# Patient Record
Sex: MALE | Race: BLACK OR AFRICAN AMERICAN | NOT HISPANIC OR LATINO | Employment: UNEMPLOYED | URBAN - METROPOLITAN AREA
[De-identification: names, ages, dates, MRNs, and addresses within clinical notes are randomized per-mention and may not be internally consistent; named-entity substitution may affect disease eponyms.]

---

## 2017-10-05 DIAGNOSIS — M25.531 RIGHT WRIST PAIN: Primary | ICD-10-CM

## 2017-10-06 ENCOUNTER — OFFICE VISIT (OUTPATIENT)
Dept: ORTHOPEDICS | Facility: CLINIC | Age: 32
End: 2017-10-06
Payer: MEDICAID

## 2017-10-06 ENCOUNTER — HOSPITAL ENCOUNTER (OUTPATIENT)
Dept: RADIOLOGY | Facility: HOSPITAL | Age: 32
Discharge: HOME | End: 2017-10-06
Attending: ORTHOPAEDIC SURGERY
Payer: MEDICAID

## 2017-10-06 VITALS
WEIGHT: 216.06 LBS | HEART RATE: 60 BPM | HEIGHT: 67 IN | BODY MASS INDEX: 33.91 KG/M2 | DIASTOLIC BLOOD PRESSURE: 75 MMHG | SYSTOLIC BLOOD PRESSURE: 132 MMHG

## 2017-10-06 DIAGNOSIS — S62.101D CLOSED FRACTURE OF RIGHT WRIST WITH ROUTINE HEALING, SUBSEQUENT ENCOUNTER: Primary | ICD-10-CM

## 2017-10-06 DIAGNOSIS — M25.531 RIGHT WRIST PAIN: ICD-10-CM

## 2017-10-06 DIAGNOSIS — S63.501A SPRAIN OF RIGHT WRIST, INITIAL ENCOUNTER: Primary | ICD-10-CM

## 2017-10-06 PROCEDURE — 99999 PR PBB SHADOW E&M-EST. PATIENT-LVL III: CPT | Mod: PBBFAC,,, | Performed by: ORTHOPAEDIC SURGERY

## 2017-10-06 PROCEDURE — 99204 OFFICE O/P NEW MOD 45 MIN: CPT | Mod: S$PBB,,, | Performed by: ORTHOPAEDIC SURGERY

## 2017-10-06 PROCEDURE — 99213 OFFICE O/P EST LOW 20 MIN: CPT | Mod: PBBFAC,25,PO | Performed by: ORTHOPAEDIC SURGERY

## 2017-10-06 PROCEDURE — 73110 X-RAY EXAM OF WRIST: CPT | Mod: TC,PO,RT

## 2017-10-06 PROCEDURE — 73110 X-RAY EXAM OF WRIST: CPT | Mod: 26,RT,, | Performed by: RADIOLOGY

## 2017-10-06 RX ORDER — BACLOFEN 20 MG/1
TABLET ORAL
Refills: 0 | COMMUNITY
Start: 2017-09-26

## 2017-10-06 RX ORDER — HYDROCODONE BITARTRATE AND ACETAMINOPHEN 5; 325 MG/1; MG/1
1 TABLET ORAL
COMMUNITY
Start: 2017-10-03 | End: 2017-10-06

## 2017-10-06 RX ORDER — IBUPROFEN 800 MG/1
TABLET ORAL
Refills: 0 | COMMUNITY
Start: 2017-09-26

## 2017-10-06 NOTE — PATIENT INSTRUCTIONS
Wrist Sprain  A sprain is an injury to the ligaments or capsule that holds a joint together. There are no broken bones. Most sprains take about 3 to 6 weeks to heal. If it a severe sprain where the ligament is completely torn, it can take months to recover.     Most wrist sprains are treated with a splint, wrist brace, or elastic wrap for support. Severe sprains may require surgery.  Home care  · Keep your arm elevated to reduce pain and swelling. This is very important during the first 48 hours.  · Apply an ice pack over the injured area for 15 to 20 minutes every 3 to 6 hours. You should do this for the first 24 to 48 hours. You can make an ice pack by filling a plastic bag that seals at the top with ice cubes and then wrapping it with a thin towel. Continue to use ice packs for relief of pain and swelling as needed. As the ice melts, be careful to avoid getting your wrap, splint, or cast wet. After 48 hours, apply heat (warm shower or warm bath) for 15 to 20 minutes several times a day, or alternate ice and heat.   · You may use over-the-counter pain medicine to control pain, unless another pain medicine was prescribed. If you have chronic liver or kidney disease or ever had a stomach ulcer or GI bleeding, talk with your doctor before using these medicines.  · If you were given a splint or brace, wear it for the time advised by your doctor.  Follow-up care  Follow up with your healthcare provider as advised. Any X-rays you had today dont show any broken bones, breaks, or fractures. Sometimes fractures dont show up on the first X-ray. Bruises and sprains can sometimes hurt as much as a fracture. These injuries can take time to heal completely. If your symptoms dont improve or they get worse, talk with your doctor. You may need a repeat X-ray. If X-rays were taken, you will be told of any new findings that may affect your care.  When to seek medical advice  Call your healthcare provider right away if any of  these occur:  · Pain or swelling increases  · Fingers or hand becomes cold, blue, numb, or tingly  Date Last Reviewed: 11/20/2015  © 0764-4657 [employer]. [address] [place] [address], [place] [address]. All rights reserved. This information is not intended as a substitute for professional medical care. Always follow your healthcare professional's instructions.
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
## 2017-10-14 ENCOUNTER — HOSPITAL ENCOUNTER (EMERGENCY)
Facility: HOSPITAL | Age: 32
Discharge: HOME | End: 2017-10-14
Attending: EMERGENCY MEDICINE
Payer: MEDICAID

## 2017-10-14 VITALS
OXYGEN SATURATION: 98 % | WEIGHT: 215 LBS | DIASTOLIC BLOOD PRESSURE: 90 MMHG | SYSTOLIC BLOOD PRESSURE: 145 MMHG | HEIGHT: 67 IN | BODY MASS INDEX: 33.74 KG/M2 | TEMPERATURE: 98 F | HEART RATE: 67 BPM | RESPIRATION RATE: 18 BRPM

## 2017-10-14 DIAGNOSIS — J06.9 UPPER RESPIRATORY TRACT INFECTION, UNSPECIFIED TYPE: Primary | ICD-10-CM

## 2017-10-14 PROCEDURE — 99283 EMERGENCY DEPT VISIT LOW MDM: CPT

## 2017-10-14 RX ORDER — BENZONATATE 100 MG/1
100 CAPSULE ORAL 3 TIMES DAILY PRN
Qty: 20 CAPSULE | Refills: 0 | Status: SHIPPED | OUTPATIENT
Start: 2017-10-14 | End: 2017-10-24

## 2017-10-14 NOTE — ED PROVIDER NOTES
SCRIBE #1 NOTE: I, Priya Lorenzo, am scribing for, and in the presence of, Demetrius Dexter Jr., MD. I have scribed the entire note.      History      Chief Complaint   Patient presents with    Sore Throat     sore throat x 2-3 days       Review of patient's allergies indicates:  No Known Allergies     HPI   HPI    10/14/2017, 3:15 AM   History obtained from the patient      History of Present Illness: Tesfaye Casanova is a 32 y.o. male patient who presents to the Emergency Department for sore throat which onset gradually a few days ago. Pt reports having a cold earlier in the week, but the sore throat only started a few days ago. Pt denies any sick contacts. Symptoms are constant and moderate in severity. No mitigating or exacerbating factors reported. Associated sxs include cough, congestion, and ear pain described as pressure. Pt reports daily use of 800mg Ibuprofen. Pt reports daily use of cigarettes. Patient denies any fever, chills, difficulty swallowing, rhinorrhea, ear discharge, N/V, and all other sxs at this time. No further complaints or concerns at this time.     Arrival mode: Personal vehicle      PCP: Primary Doctor No       Past Medical History:  No past medical history on file.    Past Surgical History:  No past surgical history on file.      Family History:  No family history on file.    Social History:  Social History     Social History Main Topics    Smoking status: Current Every Day Smoker    Smokeless tobacco: Never Used    Alcohol use Unknown    Drug use: Unknown    Sexual activity: Unknown       ROS   Review of Systems   Constitutional: Negative for chills and fever.   HENT: Positive for congestion, ear pain (pressure) and sore throat. Negative for ear discharge, rhinorrhea and trouble swallowing.    Respiratory: Positive for cough. Negative for shortness of breath.    Cardiovascular: Negative for chest pain.   Gastrointestinal: Negative for nausea and vomiting.   Genitourinary: Negative for  "dysuria.   Musculoskeletal: Negative for back pain.   Skin: Negative for rash.   Neurological: Negative for weakness.   Hematological: Does not bruise/bleed easily.   All other systems reviewed and are negative.      Physical Exam      Initial Vitals [10/14/17 0255]   BP Pulse Resp Temp SpO2   (!) 145/90 67 18 97.6 °F (36.4 °C) 98 %      MAP       108.33          Physical Exam  Nursing Notes and Vital Signs Reviewed.  Constitutional: Patient is in no acute distress. Well-developed and well-nourished.  Head: Atraumatic. Normocephalic.  Eyes: PERRL. EOM intact. Conjunctivae are not pale. No scleral icterus.  Ears: Retracted TM bilat. No erythema. No bulging. No effusion or air-fluid levels. No perforation.   Nose: Nasal congestion. Turbinates are normal. No drainage.   Throat: Moist mucous membranes. Posterior oropharynx is symmetric with erythema. Tonsillar exudate is not present. No tonsillar edema. No trismus. Normal handling of secretions. No stridor.  Neck: Supple. Full ROM. No lymphadenopathy.  Cardiovascular: Regular rate. Regular rhythm. No murmurs, rubs, or gallops. Distal pulses are 2+ and symmetric.  Pulmonary/Chest: No respiratory distress. Clear to auscultation bilaterally. No wheezing, rales, or rhonchi.  Abdominal: Soft and non-distended.  There is no tenderness.  No rebound, guarding, or rigidity. Good bowel sounds.  Genitourinary: No CVA tenderness  Musculoskeletal: Moves all extremities. No obvious deformities. No edema. No calf tenderness.  Skin: Warm and dry.  Neurological:  Alert, awake, and appropriate.  Normal speech.  No acute focal neurological deficits are appreciated.  Psychiatric: Normal affect. Good eye contact. Appropriate in content.    ED Course    Procedures  ED Vital Signs:  Vitals:    10/14/17 0255   BP: (!) 145/90   Pulse: 67   Resp: 18   Temp: 97.6 °F (36.4 °C)   TempSrc: Oral   SpO2: 98%   Weight: 97.5 kg (215 lb)   Height: 5' 7" (1.702 m)            The Emergency Provider " reviewed the vital signs and test results, which are outlined above.    ED Discussion     3:26 AM: Reassessed pt at this time. Discussed with pt all pertinent ED information and results. Discussed pt dx of upper respiratory tract infection and plan of tx. Gave pt all f/u and return to the ED instructions. All questions and concerns were addressed at this time. Pt expresses understanding of information and instructions, and is comfortable with plan to discharge. Pt is stable for discharge.    I discussed with patient and/or family/caretaker that evaluation in the ED does not suggest any emergent or life threatening medical conditions requiring immediate intervention beyond what was provided in the ED, and I believe patient is safe for discharge.  Regardless, an unremarkable evaluation in the ED does not preclude the development or presence of a serious of life threatening condition. As such, patient was instructed to return immediately for any worsening or change in current symptoms.    Regarding UPPER RESPIRATORY ILLNESS, for treatment, I encouraged patient to: drink plenty of fluids; get lots of rest; take medications as prescribed; use over-the-counter medications for symptomatic treatment;  and use a humidifier or steam in the bathroom to improve patency of upper airway.  Patient instructed to notify primary care provider, or return to the emergency department, if the they: have a cough most days or have a cough that returns frequently; begin coughing up blood; develop a high fever or shaking chills; have a low-grade fever for three or more days; develop thick, greenish mucus, especially if it has a bad smell; and experience shortness of breath or chest pain. For prevention, discussed with patient the importance of refraining from smoking (if applicable), getting annual influenza vaccines, reducing exposure to air pollution, and the need to frequently wash hands to avoid spread of infection.     ED  Medication(s):  Medications - No data to display    Discharge Medication List as of 10/14/2017  3:26 AM      START taking these medications    Details   benzonatate (TESSALON) 100 MG capsule Take 1 capsule (100 mg total) by mouth 3 (three) times daily as needed for Cough., Starting Sat 10/14/2017, Until Tue 10/24/2017, Print             Follow-up Information     [facility]. Schedule an appointment as soon as possible for a visit in 1 week.    Contact information:  [address] [facility] [address] [phone]             [facility] - Internal Medicine. Schedule an appointment as soon as possible for a visit in 1 week.    Specialty:  Internal Medicine  Contact information:  [address] [facility] [address] [phone]  Additional information:  (off [facility]) 1st floor           [facility] - .    Specialty:  Emergency Medicine  Why:  As needed, If symptoms worsen  Contact information:  [address] [facility] [address] [phone]                   Medical Decision Making              Scribe Attestation:   Scribe #1: I performed the above scribed service and the documentation accurately describes the services I performed. I attest to the accuracy of the note.    Attending:   Physician Attestation Statement for Scribe #1: I, Demetrius Dexter Jr., MD, personally performed the services described in this documentation, as scribed by Priya Ventura, in my presence, and it is both accurate and complete.          Clinical Impression       ICD-10-CM ICD-9-CM   1. Upper respiratory tract infection, unspecified type J06.9 465.9       Disposition:   Disposition: Discharged  Condition: Stable         Demetrius Dexter Jr., MD  10/14/17 2027

## 2017-10-16 ENCOUNTER — TELEPHONE (OUTPATIENT)
Dept: RADIOLOGY | Facility: HOSPITAL | Age: 32
End: 2017-10-16

## 2017-10-17 ENCOUNTER — HOSPITAL ENCOUNTER (OUTPATIENT)
Dept: RADIOLOGY | Facility: HOSPITAL | Age: 32
Discharge: HOME | End: 2017-10-17
Attending: ORTHOPAEDIC SURGERY
Payer: MEDICAID

## 2017-10-17 DIAGNOSIS — S62.101D CLOSED FRACTURE OF RIGHT WRIST WITH ROUTINE HEALING, SUBSEQUENT ENCOUNTER: ICD-10-CM

## 2017-10-17 PROCEDURE — 73221 MRI JOINT UPR EXTREM W/O DYE: CPT | Mod: TC,PO,RT

## 2017-10-17 PROCEDURE — 73221 MRI JOINT UPR EXTREM W/O DYE: CPT | Mod: 26,RT,, | Performed by: RADIOLOGY
